# Patient Record
(demographics unavailable — no encounter records)

---

## 2018-02-13 NOTE — ER
DATE SEEN:  02/10/2018

 

TIME SEEN:  The patient was seen at 0032 hours.

 

CHIEF COMPLAINT:  Bit by Blue Lis mix puppy dog.

 

HISTORY OF PRESENT ILLNESS:  The patient was bit by this dog this evening as she

was playing with the dog.  The dog has his immunizations up-to-date according to

the parent's report, and the patient was bit in the neck.  There were actually 3

scratch marks on the neck and a superficial right mandible scratch raisa.  The 
dog bit at 50 degrees angle to the axis of the

neck, on the left side.  The patient denies any pain or discomfort, difficulty

with vision, talking, speaking, swallowing, or breathing.

 

PAST MEDICAL HISTORY:  Normal without any abnormalities.

 

MEDICATIONS:  No medications.

 

IMMUNIZATIONS:  Up-to-date.  It is thought that the dog has rabies up-to-date,

and the family will be checking the rabies results tomorrow.

 

Police have not been notified, nor the  notified.

 

The nurse noted it was not a reportable disease or reportable to the police.

 

Otherwise, child is healthy.

 

ALLERGIES:  No allergies.

 

PHYSICAL EXAMINATION:  VITAL SIGNS:  Pulse 92, respirations 20, oxygen

saturations 100, temperature 36.8 degrees.  GENERAL:  The patient has pajamas on

and is slightly sleepy, but is alert and appropriate.  No difficulty breathing,

and mother and father are here.  HEENT:  Pharynx without abnormality.  Eyes:

Appropriate.  TMs normal appearance.  Pharynx negative.  No facial bites.

Superficial tooth marks on the left neck.  No ecchymosis noted.  NECK:  Supple.

No hematoma noted.  No compromised air exchange.  Trachea midline.  No

deviation.  LUNGS:  Clear without rales, rhonchi, or wheezes.  HEART:  S1, S2.

No murmur.  No abnormalities.  ABDOMEN:  Nontender.  No guarding.  No abdominal

discomfort.  EXTREMITIES:  Without abnormality.  DERMIS:  Superficial abrasion

on the left neck.  The patient's tetanus is up-to-date.



ASSESSMENT:  Dog bite to neck.

 

PLAN:  Treat with Augmentin, as 10% of the canine bites can be infected with

pasteurella multocida.  Followup with doctor in a week, earlier if worse.

Parents will be checking tomorrow regarding the rabies status of the dog.  If

there is any question about rabies, further intervention may be required; if so,

parents to contact their physician.

 

There is a very superficial scratch raisa on the right lateral mid mandible,

inferior ramus.  These are cleansed and Betadine placed.  Parents to follow up

regarding the dog's status tomorrow.

 

Job#: [id number]

DD: 02/10/2018 [id number]

DT: 02/11/2018 2325 KING/DIALLO PARKS